# Patient Record
Sex: FEMALE | Race: WHITE | Employment: UNEMPLOYED | URBAN - METROPOLITAN AREA
[De-identification: names, ages, dates, MRNs, and addresses within clinical notes are randomized per-mention and may not be internally consistent; named-entity substitution may affect disease eponyms.]

---

## 2021-06-10 ENCOUNTER — HOSPITAL ENCOUNTER (EMERGENCY)
Age: 21
Discharge: HOME | End: 2021-06-10
Attending: EMERGENCY MEDICINE
Payer: COMMERCIAL

## 2021-06-10 VITALS
SYSTOLIC BLOOD PRESSURE: 124 MMHG | HEART RATE: 100 BPM | WEIGHT: 165.79 LBS | RESPIRATION RATE: 17 BRPM | HEIGHT: 69 IN | BODY MASS INDEX: 24.56 KG/M2 | DIASTOLIC BLOOD PRESSURE: 85 MMHG | OXYGEN SATURATION: 98 % | TEMPERATURE: 99 F

## 2021-06-10 DIAGNOSIS — J01.00 ACUTE MAXILLARY SINUSITIS, RECURRENCE NOT SPECIFIED: Primary | ICD-10-CM

## 2021-06-10 DIAGNOSIS — R51.9 ACUTE NONINTRACTABLE HEADACHE, UNSPECIFIED HEADACHE TYPE: ICD-10-CM

## 2021-06-10 PROCEDURE — 99284 EMERGENCY DEPT VISIT MOD MDM: CPT

## 2021-06-10 RX ORDER — FLUTICASONE PROPIONATE 50 MCG
SPRAY, SUSPENSION (ML) NASAL
Qty: 1 BOTTLE | Refills: 0 | Status: SHIPPED | OUTPATIENT
Start: 2021-06-10

## 2021-06-10 RX ORDER — CEFADROXIL 500 MG/1
500 CAPSULE ORAL 2 TIMES DAILY
Qty: 28 CAPSULE | Refills: 0 | Status: SHIPPED | OUTPATIENT
Start: 2021-06-10 | End: 2021-06-24

## 2021-06-10 RX ORDER — PSEUDOEPHEDRINE HYDROCHLORIDE 30 MG/1
30 TABLET ORAL 3 TIMES DAILY
Qty: 42 TABLET | Refills: 0 | Status: SHIPPED | OUTPATIENT
Start: 2021-06-10 | End: 2021-06-24

## 2021-06-10 RX ORDER — BUTALBITAL, ACETAMINOPHEN AND CAFFEINE 50; 325; 40 MG/1; MG/1; MG/1
1 TABLET ORAL EVERY 4 HOURS PRN
Qty: 30 TABLET | Refills: 0 | Status: SHIPPED | OUTPATIENT
Start: 2021-06-10

## 2021-06-10 ASSESSMENT — PAIN DESCRIPTION - DESCRIPTORS
DESCRIPTORS: PRESSURE
DESCRIPTORS: PRESSURE

## 2021-06-10 ASSESSMENT — PAIN DESCRIPTION - PAIN TYPE
TYPE: ACUTE PAIN
TYPE: ACUTE PAIN

## 2021-06-10 ASSESSMENT — ENCOUNTER SYMPTOMS
SHORTNESS OF BREATH: 0
EYE PAIN: 0
EYE DISCHARGE: 1
FACIAL SWELLING: 1
SORE THROAT: 0
DIARRHEA: 0
VOMITING: 0
COUGH: 0
NAUSEA: 1
RHINORRHEA: 0
WHEEZING: 0
BACK PAIN: 0
ABDOMINAL PAIN: 0

## 2021-06-10 ASSESSMENT — PAIN SCALES - GENERAL
PAINLEVEL_OUTOF10: 6
PAINLEVEL_OUTOF10: 6

## 2021-06-10 ASSESSMENT — PAIN DESCRIPTION - PROGRESSION
CLINICAL_PROGRESSION: NOT CHANGED
CLINICAL_PROGRESSION: NOT CHANGED

## 2021-06-10 ASSESSMENT — PAIN DESCRIPTION - FREQUENCY: FREQUENCY: CONTINUOUS

## 2021-06-10 ASSESSMENT — PAIN - FUNCTIONAL ASSESSMENT: PAIN_FUNCTIONAL_ASSESSMENT: PREVENTS OR INTERFERES SOME ACTIVE ACTIVITIES AND ADLS

## 2021-06-10 ASSESSMENT — PAIN DESCRIPTION - LOCATION
LOCATION: HEAD
LOCATION: HEAD

## 2021-06-10 NOTE — ED PROVIDER NOTES
[facility]  eMERGENCY dEPARTMENT eNCOUnter        Pt Name: Quinton Fleischer  MRN: [id number]  [place] 2000  Date of evaluation: 6/10/2021  Provider: León Putnam MD  PCP: No primary care provider on file. CHIEF COMPLAINT       Chief Complaint   Patient presents with    Headache     Patient complains of a headache for two days. Has not taken anything for the pain. Feels pressure in her sinuses and temporal area. Pressure increases when she bends down towards the floor. No vomiting. Tried resting today without her 4 kids, without relief. HISTORY OFPRESENT ILLNESS   (Location/Symptom, Timing/Onset, Context/Setting, Quality, Duration, Modifying Factors,Severity)  Note limiting factors. Quinton Fleischer is a 24 y.o. female       Location/Symptom: Headache  Timing/Onset: 2 days  Context/Setting: She has noted swelling on the right side of her face and around the right eye. She has noted purulent drainage from the right eye. She has some nausea. She does have a history of migraines. Quality: Pressure  Duration: 2 days  Modifying Factors: Definitely worse when she bends over. Severity: 6 out of 10    Nursing Noteswere all reviewed and agreed with or any disagreements were addressed  in the HPI. REVIEW OF SYSTEMS    (2-9 systems for level 4, 10 or more for level 5)     Review of Systems   Constitutional: Negative for chills, fatigue and fever. HENT: Positive for facial swelling. Negative for ear pain, rhinorrhea and sore throat. Eyes: Positive for discharge. Negative for pain and visual disturbance. Respiratory: Negative for cough, shortness of breath and wheezing. Cardiovascular: Negative for chest pain, palpitations and leg swelling. Gastrointestinal: Positive for nausea. Negative for abdominal pain, diarrhea and vomiting. Genitourinary: Negative for difficulty urinating, dysuria, pelvic pain and vaginal discharge. Musculoskeletal: Negative for arthralgias, back pain, joint swelling and neck pain. Skin: Negative for rash. Allergic/Immunologic: Negative for environmental allergies. Neurological: Positive for headaches. Negative for dizziness, seizures and syncope. Hematological: Negative for adenopathy. Psychiatric/Behavioral: Negative for dysphoric mood and suicidal ideas. The patient is not nervous/anxious.           PAST MEDICAL HISTORY     Past Medical History:   Diagnosis Date    Depression     hx self mutilation    Mental disorder          SURGICAL HISTORY     Past Surgical History:   Procedure Laterality Date     SECTION  may 17,2016    MOUTH SURGERY      cyst removal upper gum at birth         [address]       Previous Medications    No medications on file       ALLERGIES     Ibuprofen    FAMILY HISTORY       Family History   Problem Relation Age of Onset    Cancer Maternal Aunt     Heart Disease Maternal Aunt     Birth Defects Maternal Aunt     Cancer Maternal Grandmother     Heart Disease Maternal Grandmother     Diabetes Maternal Grandmother     Heart Disease Maternal Grandfather     Stroke Maternal Grandfather     Cancer Paternal Grandmother     Cancer Maternal Cousin     Stroke Maternal Cousin           SOCIAL HISTORY       Social History     Socioeconomic History    Marital status: Single     Spouse name: None    Number of children: None    Years of education: None    Highest education level: None   Occupational History    None   Tobacco Use    Smoking status: Current Every Day Smoker     Packs/day: 0.50     Years: 1.00     Pack years: 0.50     Types: Cigarettes    Smokeless tobacco: Never Used   Substance and Sexual Activity    Alcohol use: Yes     Comment: once a year    Drug use: No    Sexual activity: Yes     Partners: Male   Other Topics Concern    None   Social History Narrative    None     Social Determinants of Health     Financial Resource Strain:     100   Resp: 17   Temp: 99 °F (37.2 °C)   TempSrc: Oral   SpO2: 98%   Weight: 165 lb 12.6 oz (75.2 kg)   Height: 5' 9\" (1.753 m)       Patient was given the following medications:  Medications - No data to display    Clinically this patient has sinusitis. It does not appear to be a routine viral URI but straightforward purulent sinusitis with purulent drainage from the lacrimal duct and also seen in the nasopharynx on oropharyngeal exam.  It does get worse when she leans forward. I do not have concern for meningitis or intracranial pathology. She has a history of syncopal episodes but none with this. She is also fully alert there is no photophobia no meningismus. FINAL IMPRESSION      1. Acute maxillary sinusitis, recurrence not specified    2.  Acute nonintractable headache, unspecified headache type          DISPOSITION/PLAN    DISPOSITION Decision To Discharge 06/10/2021 05:34:30 PM      PATIENT REFERRED TO:  [facility]) Pre-Services  [phone]          DISCHARGE MEDICATIONS:  New Prescriptions    BUTALBITAL-ACETAMINOPHEN-CAFFEINE (FIORICET, ESGIC) -40 MG PER TABLET    Take 1 tablet by mouth every 4 hours as needed for Headaches    CEFADROXIL (DURICEF) 500 MG CAPSULE    Take 1 capsule by mouth 2 times daily for 14 days    FLUTICASONE (FLONASE) 50 MCG/ACT NASAL SPRAY    1 spray in each nostril twice daily    PSEUDOEPHEDRINE (DECONGESTANT) 30 MG TABLET    Take 1 tablet by mouth 3 times daily for 14 days       DISCONTINUED MEDICATIONS:  Discontinued Medications    No medications on file              (Please note that portions of this note were completed with a voice recognition program.  Efforts were made to editthe dictations but occasionally words are mis-transcribed.)    Krystin Johnson MD (electronically signed)           Krystin Johnson MD  06/10/21 1938

## 2022-11-03 ENCOUNTER — HOSPITAL ENCOUNTER (EMERGENCY)
Age: 22
Discharge: HOME | End: 2022-11-03
Attending: EMERGENCY MEDICINE
Payer: COMMERCIAL

## 2022-11-03 VITALS
RESPIRATION RATE: 16 BRPM | DIASTOLIC BLOOD PRESSURE: 71 MMHG | TEMPERATURE: 99.5 F | OXYGEN SATURATION: 100 % | SYSTOLIC BLOOD PRESSURE: 121 MMHG | BODY MASS INDEX: 19.26 KG/M2 | HEIGHT: 69 IN | WEIGHT: 130 LBS | HEART RATE: 70 BPM

## 2022-11-03 DIAGNOSIS — K02.9 DENTAL DECAY: Primary | ICD-10-CM

## 2022-11-03 DIAGNOSIS — K04.7 INFECTED TOOTH: ICD-10-CM

## 2022-11-03 PROCEDURE — 6370000000 HC RX 637 (ALT 250 FOR IP): Performed by: EMERGENCY MEDICINE

## 2022-11-03 PROCEDURE — 99283 EMERGENCY DEPT VISIT LOW MDM: CPT

## 2022-11-03 RX ORDER — ACETAMINOPHEN 500 MG
1000 TABLET ORAL ONCE
Status: COMPLETED | OUTPATIENT
Start: 2022-11-03 | End: 2022-11-03

## 2022-11-03 RX ORDER — PENICILLIN V POTASSIUM 250 MG/1
500 TABLET ORAL ONCE
Status: COMPLETED | OUTPATIENT
Start: 2022-11-03 | End: 2022-11-03

## 2022-11-03 RX ORDER — DICLOFENAC SODIUM 75 MG/1
75 TABLET, DELAYED RELEASE ORAL 2 TIMES DAILY PRN
Qty: 14 TABLET | Refills: 0 | Status: SHIPPED | OUTPATIENT
Start: 2022-11-03 | End: 2022-11-10

## 2022-11-03 RX ORDER — PENICILLIN V POTASSIUM 500 MG/1
500 TABLET ORAL 4 TIMES DAILY
Qty: 28 TABLET | Refills: 0 | Status: SHIPPED | OUTPATIENT
Start: 2022-11-03 | End: 2022-11-10

## 2022-11-03 RX ADMIN — PENICILLIN V POTASSIUM 500 MG: 250 TABLET, FILM COATED ORAL at 20:17

## 2022-11-03 RX ADMIN — ACETAMINOPHEN 1000 MG: 500 TABLET ORAL at 20:17

## 2022-11-03 ASSESSMENT — LIFESTYLE VARIABLES
HOW OFTEN DO YOU HAVE A DRINK CONTAINING ALCOHOL: NEVER
HOW MANY STANDARD DRINKS CONTAINING ALCOHOL DO YOU HAVE ON A TYPICAL DAY: PATIENT DOES NOT DRINK

## 2022-11-03 ASSESSMENT — PAIN DESCRIPTION - PAIN TYPE: TYPE: ACUTE PAIN

## 2022-11-03 ASSESSMENT — PAIN DESCRIPTION - LOCATION: LOCATION: MOUTH

## 2022-11-03 ASSESSMENT — PAIN DESCRIPTION - ORIENTATION: ORIENTATION: LEFT;UPPER

## 2022-11-03 ASSESSMENT — PAIN - FUNCTIONAL ASSESSMENT
PAIN_FUNCTIONAL_ASSESSMENT: PREVENTS OR INTERFERES SOME ACTIVE ACTIVITIES AND ADLS
PAIN_FUNCTIONAL_ASSESSMENT: 0-10

## 2022-11-03 ASSESSMENT — PAIN SCALES - GENERAL
PAINLEVEL_OUTOF10: 6
PAINLEVEL_OUTOF10: 6

## 2022-11-03 ASSESSMENT — PAIN DESCRIPTION - FREQUENCY: FREQUENCY: CONTINUOUS

## 2022-11-03 ASSESSMENT — ENCOUNTER SYMPTOMS
FACIAL SWELLING: 0
COLOR CHANGE: 0

## 2022-11-03 ASSESSMENT — PAIN DESCRIPTION - DESCRIPTORS: DESCRIPTORS: SORE

## 2022-11-04 NOTE — ED NOTES
Discharge instructions reviewed with patient and verbalized understanding, denies further questions and successful teach back occurred. Offered wheelchair for discharge and declined. Discharged ambulatory with steady gait to ED lobby. Written discharge instructions provided to patient. Prescriptions x3 sent electronically to patient's pharmacy.       Damien Mckeon RN  11/03/22 [id number]

## 2022-11-04 NOTE — ED PROVIDER NOTES
Emergency Department Provider Note  Location: [facility]  11/3/2022     Patient Identification  Avelino Stewart is a 20 y.o. female    Chief Complaint  Dental Pain (Patient c/o cracked wisdom tooth for over a year, has a sore on inside of mouth near wisdom tooth. )      Mode of Arrival  private car    HPI  (History provided by patient)  This is a 20 y.o. female with a PMH significant for impacted wisdom tooth presented today for dental pain in left lower jaw. Patient states she has known cracked and impacted wisdom tooth in the left lower jaw for over a year. Last week, she noticed some soreness developing but the pain was tolerable. Today, she \"popped a sore\" and felt drainage. The pain increased significantly after that. She describe it as a 9/10 throbbing pain that is constant. The pain does not radiate. No fever. No change in her voice. ROS  Review of Systems   Constitutional:  Negative for chills and fever. HENT:  Positive for dental problem. Negative for drooling and facial swelling. Musculoskeletal:  Negative for neck stiffness. Skin:  Negative for color change. I have reviewed the following nursing documentation:  Allergies: Allergies   Allergen Reactions    Ibuprofen Hives       Past medical history:  has a past medical history of Depression and Mental disorder. Past surgical history:  has a past surgical history that includes Mouth surgery and  section (may 17,2016). Home medications: none reported    Social history:  reports that she has been smoking cigarettes. She has a 0.50 pack-year smoking history. She has never used smokeless tobacco. She reports current alcohol use. She reports that she does not use drugs.     Family history:    Family History   Problem Relation Age of Onset    Cancer Maternal Aunt     Heart Disease Maternal Aunt     Birth Defects Maternal Aunt     Cancer Maternal Grandmother     Heart Disease Maternal Grandmother a dentist that she can call. Advised that if she cannot get into see her regular dentist quickly, she should consider urgent dental care clinics. - Triage VS showed heart rate 102. By the time I saw the patient, heart rate was normal.  On recheck, heart rate was 70.  -With regards to pain control at home, I discussed different options with the patient. We agreed to Magic mouthwash and Voltaren. Patient has ibuprofen documented as one of her allergies. She states she cannot take Aleve/naproxen without any adverse reaction. Patient would like to give Voltaren a trial after I specifically discussed the fact that it is in the NSAID class and she may be allergic to it. She knows to try 1 dose and if she breaks out in hives, she should take Benadryl immediately and proceed to come to the ED if the rash is severe or if she has shortness of breath, facial swelling, or difficulty breathing. We discussed the alternative of Tylenol but patient would still like to try Voltaren. - Return precautions also discussed. patient verbalized understanding of care plan and agreed to follow-up with PCP as advised. - Is this patient to be included in the SEP-1 Core Measure due to severe sepsis or septic shock? No   Exclusion criteria - the patient is NOT to be included for SEP-1 Core Measure due to:  2+ SIRS criteria are not met    Clinical Impression:  1. Dental decay    2. Infected tooth          Disposition:  Discharge to home in good condition. Blood pressure 121/71, pulse 70, temperature 99.5 °F (37.5 °C), temperature source Tympanic, resp. rate 16, height 5' 9\" (1.753 m), weight 130 lb (59 kg), SpO2 100 %, unknown if currently breastfeeding. Patient was given scripts for the following medications. I counseled patient how to take these medications.    New Prescriptions    DICLOFENAC (VOLTAREN) 75 MG EC TABLET    Take 1 tablet by mouth 2 times daily as needed for Pain    MAGIC MOUTHWASH (MIRACLE MOUTHWASH) Swish and spit 5 mLs 4 times daily as needed for Irritation or Dental Pain 1:1:1 ratio of diphenhydramine, nystatin, and lidocaine    PENICILLIN V POTASSIUM (VEETID) 500 MG TABLET    Take 1 tablet by mouth 4 times daily for 7 days       Disposition referral (if applicable): Your dentist or dental urgent care clinic    Schedule an appointment as soon as possible for a visit in 3 days        Total critical care time is 0 minutes, which excludes separately billable procedures and updating family. Time spent is specifically for management of the presenting complaint and symptoms initially, direct bedside care, reevaluation, review of records, and consultation. There was a high probability of clinically significant life-threatening deterioration in the patient's condition, which required my urgent intervention. This chart was generated in part by using Dragon Dictation system and may contain errors related to that system including errors in grammar, punctuation, and spelling, as well as words and phrases that may be inappropriate. If there are any questions or concerns please feel free to contact the dictating provider for clarification.      Mumtaz Morfin MD  [address] [place] [address] Arnold Martinez MD  11/03/22 [id number]

## 2022-11-04 NOTE — ED TRIAGE NOTES
Patient ambulatory to Room 9 with c/o dental pain and a sore on her gum. She states she has had a cracked wisdom tooth for over a year and had a referral for a dentist to remove them but nobody ever called her to schedule it. Patient has pain in left upper back tooth with open area on inside of cheek. She is awake, alert, oriented, respirations easy & regular, skin w/d, MMM & pink, cap refill brisk. Dr. Max Verdugo in room to examine patient.

## 2024-07-02 ENCOUNTER — HOSPITAL ENCOUNTER (EMERGENCY)
Age: 24
Discharge: LEFT BEFORE DISCHARGE | End: 2024-07-02

## 2024-07-02 VITALS
BODY MASS INDEX: 24.78 KG/M2 | OXYGEN SATURATION: 97 % | WEIGHT: 167.33 LBS | SYSTOLIC BLOOD PRESSURE: 131 MMHG | TEMPERATURE: 98.1 F | HEIGHT: 69 IN | HEART RATE: 85 BPM | RESPIRATION RATE: 16 BRPM | DIASTOLIC BLOOD PRESSURE: 80 MMHG

## 2024-07-02 DIAGNOSIS — Z53.21 ELOPED FROM EMERGENCY DEPARTMENT: Primary | ICD-10-CM

## 2024-07-02 NOTE — ED TRIAGE NOTES
Patient to the ER with complaints of being bit by a baby raccoon.  She says the incident happened last night.  [place] and picked up a baby raccoon.  She has a very small abrasion between the knuckles of her 3rd and 4th fingers of her right hand.

## 2024-07-03 NOTE — ED NOTES
Pt eloped about 20:20per registration. Attempted to call back 2040. Pt reason given for elopement.

## 2024-07-03 NOTE — ED PROVIDER NOTES
Patient was triaged here in the emergency department.  Brought back to room for evaluation.  Signed up for patient and is soon as was brought back to room informed that patient had apparently eloped.  Did not evaluate/see patient     Ismael Baker PA  07/02/24 2050

## 2024-11-16 ENCOUNTER — HOSPITAL ENCOUNTER (EMERGENCY)
Age: 24
Discharge: HOME | End: 2024-11-16

## 2024-11-16 VITALS
DIASTOLIC BLOOD PRESSURE: 89 MMHG | TEMPERATURE: 98 F | WEIGHT: 193.12 LBS | HEIGHT: 69 IN | SYSTOLIC BLOOD PRESSURE: 128 MMHG | HEART RATE: 72 BPM | OXYGEN SATURATION: 98 % | BODY MASS INDEX: 28.6 KG/M2 | RESPIRATION RATE: 17 BRPM

## 2024-11-16 DIAGNOSIS — K62.5 BRIGHT RED RECTAL BLEEDING: ICD-10-CM

## 2024-11-16 DIAGNOSIS — K59.00 CONSTIPATION, UNSPECIFIED CONSTIPATION TYPE: Primary | ICD-10-CM

## 2024-11-16 DIAGNOSIS — K64.8 INTERNAL HEMORRHOIDS: ICD-10-CM

## 2024-11-16 LAB
ALBUMIN SERPL-MCNC: 4.3 G/DL (ref 3.4–5)
ALBUMIN/GLOB SERPL: 1.3 {RATIO} (ref 1.1–2.2)
ALP SERPL-CCNC: 131 U/L (ref 40–129)
ALT SERPL-CCNC: 16 U/L (ref 10–40)
ANION GAP SERPL CALCULATED.3IONS-SCNC: 12 MMOL/L (ref 3–16)
AST SERPL-CCNC: 24 U/L (ref 15–37)
BASOPHILS # BLD: 0.1 K/UL (ref 0–0.2)
BASOPHILS NFR BLD: 0.7 %
BILIRUB SERPL-MCNC: <0.2 MG/DL (ref 0–1)
BILIRUB UR QL STRIP.AUTO: NEGATIVE
BUN SERPL-MCNC: 11 MG/DL (ref 7–20)
CALCIUM SERPL-MCNC: 9.6 MG/DL (ref 8.3–10.6)
CHLORIDE SERPL-SCNC: 103 MMOL/L (ref 99–110)
CLARITY UR: CLEAR
CO2 SERPL-SCNC: 24 MMOL/L (ref 21–32)
COLOR UR: YELLOW
CREAT SERPL-MCNC: 0.6 MG/DL (ref 0.6–1.1)
DEPRECATED RDW RBC AUTO: 15.3 % (ref 12.4–15.4)
EOSINOPHIL # BLD: 0.4 K/UL (ref 0–0.6)
EOSINOPHIL NFR BLD: 3.7 %
GFR SERPLBLD CREATININE-BSD FMLA CKD-EPI: >90 ML/MIN/{1.73_M2}
GLUCOSE SERPL-MCNC: 102 MG/DL (ref 70–99)
GLUCOSE UR STRIP.AUTO-MCNC: NEGATIVE MG/DL
HCG SERPL QL: NEGATIVE
HCT VFR BLD AUTO: 40.4 % (ref 36–48)
HEMOCCULT STL QL: ABNORMAL
HGB BLD-MCNC: 13.1 G/DL (ref 12–16)
HGB UR QL STRIP.AUTO: NEGATIVE
KETONES UR STRIP.AUTO-MCNC: NEGATIVE MG/DL
LEUKOCYTE ESTERASE UR QL STRIP.AUTO: NEGATIVE
LYMPHOCYTES # BLD: 1.9 K/UL (ref 1–5.1)
LYMPHOCYTES NFR BLD: 18.1 %
MCH RBC QN AUTO: 29.5 PG (ref 26–34)
MCHC RBC AUTO-ENTMCNC: 32.5 G/DL (ref 31–36)
MCV RBC AUTO: 90.8 FL (ref 80–100)
MONOCYTES # BLD: 0.8 K/UL (ref 0–1.3)
MONOCYTES NFR BLD: 7.4 %
NEUTROPHILS # BLD: 7.5 K/UL (ref 1.7–7.7)
NEUTROPHILS NFR BLD: 70.1 %
NITRITE UR QL STRIP.AUTO: NEGATIVE
PH UR STRIP.AUTO: 7 [PH] (ref 5–8)
PLATELET # BLD AUTO: 338 K/UL (ref 135–450)
PMV BLD AUTO: 9.8 FL (ref 5–10.5)
POTASSIUM SERPL-SCNC: 4.1 MMOL/L (ref 3.5–5.1)
PROT SERPL-MCNC: 7.5 G/DL (ref 6.4–8.2)
PROT UR STRIP.AUTO-MCNC: NEGATIVE MG/DL
RBC # BLD AUTO: 4.45 M/UL (ref 4–5.2)
SODIUM SERPL-SCNC: 139 MMOL/L (ref 136–145)
SP GR UR STRIP.AUTO: 1.02 (ref 1–1.03)
UA COMPLETE W REFLEX CULTURE PNL UR: NORMAL
UA DIPSTICK W REFLEX MICRO PNL UR: NORMAL
URN SPEC COLLECT METH UR: NORMAL
UROBILINOGEN UR STRIP-ACNC: 0.2 E.U./DL
WBC # BLD AUTO: 10.7 K/UL (ref 4–11)

## 2024-11-16 PROCEDURE — 99283 EMERGENCY DEPT VISIT LOW MDM: CPT

## 2024-11-16 PROCEDURE — 80053 COMPREHEN METABOLIC PANEL: CPT

## 2024-11-16 PROCEDURE — 82270 OCCULT BLOOD FECES: CPT

## 2024-11-16 PROCEDURE — 85025 COMPLETE CBC W/AUTO DIFF WBC: CPT

## 2024-11-16 PROCEDURE — 84703 CHORIONIC GONADOTROPIN ASSAY: CPT

## 2024-11-16 PROCEDURE — 81003 URINALYSIS AUTO W/O SCOPE: CPT

## 2024-11-16 RX ORDER — POLYETHYLENE GLYCOL 3350 17 G/17G
17 POWDER, FOR SOLUTION ORAL DAILY
Qty: 238 G | Refills: 0 | Status: SHIPPED | OUTPATIENT
Start: 2024-11-16

## 2024-11-16 RX ORDER — HYDROCORTISONE ACETATE 25 MG/1
25 SUPPOSITORY RECTAL 2 TIMES DAILY
Qty: 24 SUPPOSITORY | Refills: 0 | Status: SHIPPED | OUTPATIENT
Start: 2024-11-16

## 2024-11-16 RX ORDER — DOCUSATE SODIUM 100 MG/1
100 CAPSULE, LIQUID FILLED ORAL 2 TIMES DAILY
Qty: 60 CAPSULE | Refills: 0 | Status: SHIPPED | OUTPATIENT
Start: 2024-11-16 | End: 2024-12-16

## 2024-11-16 ASSESSMENT — PAIN DESCRIPTION - LOCATION: LOCATION: ABDOMEN

## 2024-11-16 ASSESSMENT — PAIN DESCRIPTION - DESCRIPTORS: DESCRIPTORS: DISCOMFORT

## 2024-11-16 ASSESSMENT — PAIN SCALES - GENERAL: PAINLEVEL_OUTOF10: 4

## 2024-11-16 ASSESSMENT — PAIN DESCRIPTION - ORIENTATION: ORIENTATION: LOWER

## 2024-11-17 NOTE — ED NOTES
.Pt discharged at this time. Discharge instructions and medications reviewed,  Questions were answered. PT verbalized understanding.  VSS, Afebrile.  Follow up appointments were discussed.

## 2024-11-17 NOTE — ED PROVIDER NOTES
**ADVANCED PRACTICE PROVIDER, I HAVE EVALUATED THIS PATIENT**        [facility] EMERGENCY DEPARTMENT  EMERGENCY DEPARTMENT ENCOUNTER      Pt Name: Gisel Canela  MRN:[id number]  Birthdate 2000  Date of evaluation: 11/16/2024  Provider: Evens Cervantes PA-C  Note Started: 7:10 PM EST 11/16/24        Chief Complaint:    Chief Complaint   Patient presents with    Rectal Bleeding     Pt states \" no stool just blood in toilet\"          Nursing Notes, Past Medical Hx, Past Surgical Hx, Social Hx, Allergies, and Family Hx were all reviewed and agreed with or any disagreements were addressed in the HPI.    HPI: (Location, Duration, Timing, Severity, Quality, Assoc Sx, Context, Modifying factors)    History From: Patient          Chief Complaint of hard stools for the last 3 days, lower abdominal mild crampy sensation, bleeding when passing stools the last 3 days    This is a  24 y.o. female who presents stating history as above.  States that she really is not prone to constipation.  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  However that was not recently.  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  States that generally she just does fine.  3 days ago she started noticing that was hard to pass stool.  Started having some bleeding when she is passing stool which is small round derrick. States that she has been eating and drinking well.   Had leading even today.  Because of these things she decided to come to the ER.  No syncope or near syncope shortness of breath or difficulty breathing or pain in the chest.  States that she really is not having any severe abdominal pain but just a mild discomfort low throughout the belly.  No vaginal changes or extremity weakness or acute loss of range of motion or strength.    PastMedical/Surgical History:      Diagnosis Date    Depression     hx self mutilation    Mental

## 2024-11-17 NOTE — DISCHARGE INSTRUCTIONS
Home in stable condition to start using medications as written, drink plenty of water, and monitor for gradual improvement with time.  It does appear that you are having some abdominal cramping from constipation and rectal bleeding from hemorrhoids.  We recommend outpatient follow-up with family doctor in 3 to 5 days for recheck and further care and treatment if needed.  However hreturn to the ER if fever over 100.4 with worsening symptoms, severe worsening pain, bleeding acutely worse, any passing out or other emergent worsening or concern.